# Patient Record
Sex: MALE | Race: WHITE | NOT HISPANIC OR LATINO | Employment: FULL TIME | URBAN - NONMETROPOLITAN AREA
[De-identification: names, ages, dates, MRNs, and addresses within clinical notes are randomized per-mention and may not be internally consistent; named-entity substitution may affect disease eponyms.]

---

## 2022-12-10 ENCOUNTER — OFFICE VISIT (OUTPATIENT)
Dept: URGENT CARE | Facility: CLINIC | Age: 57
End: 2022-12-10

## 2022-12-10 VITALS — RESPIRATION RATE: 18 BRPM | HEART RATE: 83 BPM | TEMPERATURE: 98 F | OXYGEN SATURATION: 97 %

## 2022-12-10 DIAGNOSIS — J02.9 PHARYNGITIS, UNSPECIFIED ETIOLOGY: Primary | ICD-10-CM

## 2022-12-10 LAB — S PYO AG THROAT QL: NEGATIVE

## 2022-12-10 RX ORDER — VALSARTAN 320 MG/1
320 TABLET ORAL DAILY
COMMUNITY

## 2022-12-10 RX ORDER — PREDNISONE 50 MG/1
50 TABLET ORAL DAILY
Qty: 5 TABLET | Refills: 0 | Status: SHIPPED | OUTPATIENT
Start: 2022-12-10 | End: 2022-12-15

## 2022-12-10 RX ORDER — AMLODIPINE BESYLATE 5 MG/1
5 TABLET ORAL DAILY
COMMUNITY

## 2022-12-10 RX ORDER — METOPROLOL TARTRATE 50 MG/1
50 TABLET, FILM COATED ORAL EVERY 12 HOURS SCHEDULED
COMMUNITY

## 2022-12-10 RX ORDER — HYDROCHLOROTHIAZIDE 25 MG/1
25 TABLET ORAL DAILY
COMMUNITY

## 2022-12-10 RX ORDER — POTASSIUM CHLORIDE 1.5 G/1.77G
20 POWDER, FOR SOLUTION ORAL 2 TIMES DAILY
COMMUNITY

## 2022-12-10 NOTE — PATIENT INSTRUCTIONS
Prednisone once a day for five days  It is best to take earlier the day  If the symptoms are resolved in 3 days you may stop, you do not need to finish the full 5 day course  Follow up with PCP in 3-5 days  Proceed to  ER if symptoms worsen

## 2022-12-10 NOTE — PROGRESS NOTES
[facility]        NAME: Olivia Galvan is a 59 y o  male  : 1965    MRN: [id number]  DATE: December 10, 2022  TIME: 1:53 PM    Assessment and Plan   Pharyngitis, unspecified etiology [J02 9]  1  Pharyngitis, unspecified etiology  predniSONE 50 mg tablet            Patient Instructions       Prednisone once a day for five days  It is best to take earlier the day  If the symptoms are resolved in 3 days you may stop, you do not need to finish the full 5 day course  Follow up with PCP in 3-5 days  Proceed to  ER if symptoms worsen  Chief Complaint     Chief Complaint   Patient presents with   • Sore Throat     Onset this am denies fever or chills covid test today neg         History of Present Illness       Sore Throat (Onset this am denies fever or chills covid test today neg)    Not real painful, more inflamed, with a raspy/hoarse voice  Sore Throat   This is a new problem  The current episode started today  There has been no fever  Review of Systems   Review of Systems   Constitutional: Negative  HENT: Positive for sore throat and voice change  Respiratory: Negative  Cardiovascular: Negative            Current Medications       Current Outpatient Medications:   •  amLODIPine (NORVASC) 5 mg tablet, Take 5 mg by mouth daily, Disp: , Rfl:   •  hydrochlorothiazide (HYDRODIURIL) 25 mg tablet, Take 25 mg by mouth daily, Disp: , Rfl:   •  metoprolol tartrate (LOPRESSOR) 50 mg tablet, Take 50 mg by mouth every 12 (twelve) hours, Disp: , Rfl:   •  potassium chloride (KLOR-CON) 20 mEq packet, Take 20 mEq by mouth 2 (two) times a day, Disp: , Rfl:   •  predniSONE 50 mg tablet, Take 1 tablet (50 mg total) by mouth daily for 5 days, Disp: 5 tablet, Rfl: 0  •  valsartan (DIOVAN) 320 MG tablet, Take 320 mg by mouth daily, Disp: , Rfl:     Current Allergies     Allergies as of 12/10/2022   • (No Known Allergies)            The following portions of the patient's history were reviewed and updated as appropriate: allergies, current medications, past family history, past medical history, past social history, past surgical history and problem list      Past Medical History:   Diagnosis Date   • Hypertension        History reviewed  No pertinent surgical history  No family history on file  Medications have been verified  Objective   Pulse 83   Temp 98 °F (36 7 °C)   Resp 18   SpO2 97%   No LMP for male patient  Physical Exam     Physical Exam  Vitals and nursing note reviewed  Constitutional:       Appearance: He is well-developed  HENT:      Right Ear: Tympanic membrane and external ear normal       Left Ear: Tympanic membrane and external ear normal       Nose: Nose normal       Mouth/Throat:      Mouth: Mucous membranes are moist       Pharynx: Posterior oropharyngeal erythema present  No oropharyngeal exudate  Eyes:      Conjunctiva/sclera: Conjunctivae normal    Cardiovascular:      Rate and Rhythm: Normal rate and regular rhythm  Heart sounds: Normal heart sounds  No murmur heard  Pulmonary:      Effort: Pulmonary effort is normal  No respiratory distress  Breath sounds: Normal breath sounds  No wheezing or rales  Chest:      Chest wall: No tenderness  Musculoskeletal:      Cervical back: Normal range of motion and neck supple  Lymphadenopathy:      Cervical: No cervical adenopathy  Neurological:      Mental Status: He is alert